# Patient Record
Sex: FEMALE | Race: WHITE | URBAN - METROPOLITAN AREA
[De-identification: names, ages, dates, MRNs, and addresses within clinical notes are randomized per-mention and may not be internally consistent; named-entity substitution may affect disease eponyms.]

---

## 2018-09-06 ENCOUNTER — OFFICE VISIT (OUTPATIENT)
Dept: ORTHOPEDIC SURGERY | Age: 48
End: 2018-09-06

## 2018-09-06 VITALS
WEIGHT: 133 LBS | HEART RATE: 58 BPM | BODY MASS INDEX: 20.16 KG/M2 | HEIGHT: 68 IN | DIASTOLIC BLOOD PRESSURE: 69 MMHG | SYSTOLIC BLOOD PRESSURE: 106 MMHG

## 2018-09-06 DIAGNOSIS — M25.572 LEFT ANKLE PAIN, UNSPECIFIED CHRONICITY: Primary | ICD-10-CM

## 2018-09-06 DIAGNOSIS — Q66.70 PES CAVUS: ICD-10-CM

## 2018-09-06 DIAGNOSIS — M76.72 PERONEAL TENDONITIS OF LEFT LOWER LEG: ICD-10-CM

## 2018-09-06 PROCEDURE — L3040 FT ARCH SUPRT PREMOLD LONGIT: HCPCS | Performed by: FAMILY MEDICINE

## 2018-09-06 PROCEDURE — 20550 NJX 1 TENDON SHEATH/LIGAMENT: CPT | Performed by: FAMILY MEDICINE

## 2018-09-06 PROCEDURE — 99203 OFFICE O/P NEW LOW 30 MIN: CPT | Performed by: FAMILY MEDICINE

## 2018-09-06 RX ORDER — VENLAFAXINE 37.5 MG/1
37.5 TABLET ORAL DAILY
COMMUNITY

## 2018-09-06 RX ORDER — DICLOFENAC SODIUM 75 MG/1
75 TABLET, DELAYED RELEASE ORAL 2 TIMES DAILY
Qty: 60 TABLET | Refills: 3 | Status: CANCELLED | OUTPATIENT
Start: 2018-09-06

## 2018-09-06 RX ORDER — NABUMETONE 750 MG/1
750 TABLET, FILM COATED ORAL 2 TIMES DAILY
Qty: 60 TABLET | Refills: 3 | Status: SHIPPED | OUTPATIENT
Start: 2018-09-06

## 2018-09-06 RX ORDER — PANTOPRAZOLE SODIUM 40 MG/1
40 TABLET, DELAYED RELEASE ORAL DAILY
COMMUNITY

## 2018-09-06 NOTE — PROGRESS NOTES
Chief Complaint    Initial Consultation Ankle Pain (Left, no injury, painful for the last 6 months)    Initial consultation persistent left lateral ankle pain with difficulty walking and pivoting    History of Present Illness:  Macy Boyd is a 53 y.o. female who is a very pleasant white female  who works in commutes frequently throughout the week to [place] who is a very nice patient of Dr. Ashley Manley who is being seen today upon self-referral for evaluation of ongoing episodic lateral left ankle pain. She states that for the past 6+ months she has had episodic but progressively worsening pain to the lateral aspect of her left ankle overlying the peroneals. There is no history of actual injury or new activity prior to becoming symptomatic low previously she was going to the gym 3 days per week doing primarily resistance training. There is no change in her workout or evidence of ankle inversion. She has had swelling and progressive worsening of pain with toeing off phase of gait prolonged distance walking going up and down stairs and walking on uneven surfaces. She does have a fairly cavus foot but is never utilized orthotics. There is no history of trauma prior to becoming symptomatic. She has had to cutback on her exercise workout and has taken occasional Advil and ibuprofen but due to persistence of pain she does come in today for evaluation with imaging. She describes her current symptoms as more irritating and aggravating at 3-4 out of 10 with sharper 5 out of 10 pain with forceful toeing off and pivoting. Once again these are all lateral in nature overlying the peroneal and she does have some stiffness and weakness. No medial pain swelling or ecchymosis has been noted. She is being seen today for orthopedic and sports consultation with imaging.              Medical History    Patient's medications, allergies, past medical, surgical, social and family histories were reviewed and updated as appropriate. Review of Systems    Pertinent items are noted in HPI  Review of systems reviewed from Patient History Form dated on 9/6/2018 and available in the patient's chart under the Media tab. Vital Signs  Vitals:    09/06/18 0847   BP: 106/69   Pulse: 58       General Exam:     Constitutional: Patient is adequately groomed with no evidence of malnutrition  DTRs: Deep tendon reflexes are intact  Mental Status: The patient is oriented to time, place and person. The patient's mood and affect are appropriate. Lymphatic: The lymphatic examination bilaterally reveals all areas to be without enlargement or induration. Vascular: Examination reveals no swelling or calf tenderness. Peripheral pulses are palpable and 2+. Neurological: The patient has good coordination. There is no weakness or sensory deficit. Ankle Examination  Inspection:  There is no high-grade deformity of that she does have some focal swelling over the peroneal portion of her ankle laterally. This is mostly retro-malleolar. There is no tense effusion. Palpation:  She does have clinical tenderness over the retromalleolar portion of her peroneal tendon. There is no apparent subluxation. No tenderness over the ATFL but no osseous tenderness. No medial tenderness or Achilles tenderness. Rang of Motion:  She has about a 20% reduction ankle motion was some tightness to her Achilles. Strength:  She does have weakness at 4 out of 5 resisted eversion and dorsiflexion. Special Tests:  Negative anterior drawer talar tilt Guillen's testing. No evidence of peroneal subluxation. Skin: There are no rashes, ulcerations or lesions. Distal motor sensory and vascular exam is intact. Gait: She has a reasonably fluid gait with some overpronation. She does have a baseline cavus foot. Reflex symmetrically preserved.     Additional Comments:       Additional